# Patient Record
Sex: FEMALE | Race: OTHER | HISPANIC OR LATINO | Employment: UNEMPLOYED | URBAN - METROPOLITAN AREA
[De-identification: names, ages, dates, MRNs, and addresses within clinical notes are randomized per-mention and may not be internally consistent; named-entity substitution may affect disease eponyms.]

---

## 2024-07-30 ENCOUNTER — LAB VISIT (OUTPATIENT)
Dept: LAB | Facility: HOSPITAL | Age: 59
End: 2024-07-30
Attending: INTERNAL MEDICINE
Payer: COMMERCIAL

## 2024-07-30 ENCOUNTER — OFFICE VISIT (OUTPATIENT)
Dept: CARDIOLOGY | Facility: CLINIC | Age: 59
End: 2024-07-30
Payer: COMMERCIAL

## 2024-07-30 VITALS
BODY MASS INDEX: 30.17 KG/M2 | SYSTOLIC BLOOD PRESSURE: 134 MMHG | HEART RATE: 55 BPM | HEIGHT: 61 IN | DIASTOLIC BLOOD PRESSURE: 74 MMHG | WEIGHT: 159.81 LBS

## 2024-07-30 DIAGNOSIS — I20.89 ANGINAL EQUIVALENT: ICD-10-CM

## 2024-07-30 DIAGNOSIS — R94.31 ABNORMAL ECG: Primary | ICD-10-CM

## 2024-07-30 DIAGNOSIS — E78.2 HYPERLIPEMIA, MIXED: ICD-10-CM

## 2024-07-30 LAB
ALBUMIN SERPL BCP-MCNC: 4.3 G/DL (ref 3.5–5.2)
ALP SERPL-CCNC: 69 U/L (ref 55–135)
ALT SERPL W/O P-5'-P-CCNC: 22 U/L (ref 10–44)
ANION GAP SERPL CALC-SCNC: 9 MMOL/L (ref 8–16)
AST SERPL-CCNC: 24 U/L (ref 10–40)
BASOPHILS # BLD AUTO: 0.08 K/UL (ref 0–0.2)
BASOPHILS NFR BLD: 0.9 % (ref 0–1.9)
BILIRUB SERPL-MCNC: 0.6 MG/DL (ref 0.1–1)
BUN SERPL-MCNC: 12 MG/DL (ref 6–20)
CALCIUM SERPL-MCNC: 10.9 MG/DL (ref 8.7–10.5)
CHLORIDE SERPL-SCNC: 106 MMOL/L (ref 95–110)
CO2 SERPL-SCNC: 27 MMOL/L (ref 23–29)
CREAT SERPL-MCNC: 0.8 MG/DL (ref 0.5–1.4)
DIFFERENTIAL METHOD BLD: NORMAL
EOSINOPHIL # BLD AUTO: 0.1 K/UL (ref 0–0.5)
EOSINOPHIL NFR BLD: 1.4 % (ref 0–8)
ERYTHROCYTE [DISTWIDTH] IN BLOOD BY AUTOMATED COUNT: 13.4 % (ref 11.5–14.5)
EST. GFR  (NO RACE VARIABLE): >60 ML/MIN/1.73 M^2
GLUCOSE SERPL-MCNC: 91 MG/DL (ref 70–110)
HCT VFR BLD AUTO: 41 % (ref 37–48.5)
HGB BLD-MCNC: 13.4 G/DL (ref 12–16)
IMM GRANULOCYTES # BLD AUTO: 0.04 K/UL (ref 0–0.04)
IMM GRANULOCYTES NFR BLD AUTO: 0.4 % (ref 0–0.5)
LYMPHOCYTES # BLD AUTO: 2.8 K/UL (ref 1–4.8)
LYMPHOCYTES NFR BLD: 30.6 % (ref 18–48)
MCH RBC QN AUTO: 30.4 PG (ref 27–31)
MCHC RBC AUTO-ENTMCNC: 32.7 G/DL (ref 32–36)
MCV RBC AUTO: 93 FL (ref 82–98)
MONOCYTES # BLD AUTO: 0.7 K/UL (ref 0.3–1)
MONOCYTES NFR BLD: 7.9 % (ref 4–15)
NEUTROPHILS # BLD AUTO: 5.4 K/UL (ref 1.8–7.7)
NEUTROPHILS NFR BLD: 58.8 % (ref 38–73)
NRBC BLD-RTO: 0 /100 WBC
PLATELET # BLD AUTO: 280 K/UL (ref 150–450)
PMV BLD AUTO: 11 FL (ref 9.2–12.9)
POTASSIUM SERPL-SCNC: 3.6 MMOL/L (ref 3.5–5.1)
PROT SERPL-MCNC: 7.5 G/DL (ref 6–8.4)
RBC # BLD AUTO: 4.41 M/UL (ref 4–5.4)
SODIUM SERPL-SCNC: 142 MMOL/L (ref 136–145)
WBC # BLD AUTO: 9.15 K/UL (ref 3.9–12.7)

## 2024-07-30 PROCEDURE — 99204 OFFICE O/P NEW MOD 45 MIN: CPT | Mod: S$GLB,,, | Performed by: INTERNAL MEDICINE

## 2024-07-30 PROCEDURE — 99999 PR PBB SHADOW E&M-EST. PATIENT-LVL III: CPT | Mod: PBBFAC,,, | Performed by: INTERNAL MEDICINE

## 2024-07-30 PROCEDURE — 36415 COLL VENOUS BLD VENIPUNCTURE: CPT | Performed by: INTERNAL MEDICINE

## 2024-07-30 PROCEDURE — 1159F MED LIST DOCD IN RCRD: CPT | Mod: CPTII,S$GLB,, | Performed by: INTERNAL MEDICINE

## 2024-07-30 PROCEDURE — 3075F SYST BP GE 130 - 139MM HG: CPT | Mod: CPTII,S$GLB,, | Performed by: INTERNAL MEDICINE

## 2024-07-30 PROCEDURE — 80053 COMPREHEN METABOLIC PANEL: CPT | Performed by: INTERNAL MEDICINE

## 2024-07-30 PROCEDURE — 85025 COMPLETE CBC W/AUTO DIFF WBC: CPT | Performed by: INTERNAL MEDICINE

## 2024-07-30 PROCEDURE — 1160F RVW MEDS BY RX/DR IN RCRD: CPT | Mod: CPTII,S$GLB,, | Performed by: INTERNAL MEDICINE

## 2024-07-30 PROCEDURE — 3008F BODY MASS INDEX DOCD: CPT | Mod: CPTII,S$GLB,, | Performed by: INTERNAL MEDICINE

## 2024-07-30 PROCEDURE — 3078F DIAST BP <80 MM HG: CPT | Mod: CPTII,S$GLB,, | Performed by: INTERNAL MEDICINE

## 2024-07-30 RX ORDER — METOPROLOL TARTRATE 50 MG/1
TABLET ORAL
Qty: 4 TABLET | Refills: 0 | Status: SHIPPED | OUTPATIENT
Start: 2024-07-30

## 2024-07-30 RX ORDER — ATORVASTATIN CALCIUM 20 MG/1
20 TABLET, FILM COATED ORAL
COMMUNITY
Start: 2024-07-27

## 2024-07-30 NOTE — PROGRESS NOTES
Subjective:   07/30/2024     Patient ID:  Estella Turk is a 58 y.o. female who presents for evaulation of [facility]       Patient referred for EKG abnormality, today noted to have fairly marked anterior inferior ST elevation and T-wave inversion.  She does have dyspnea on exertion.  Currently no chest pains or tightness.  Her EKG has been abnormal for the last several months.  She has no history of heart problems.  She does note that she has a sister who has something wrong with the heart.  She does not know what it is.    The patient does have borderline diabetes.  Last A1c was 6.0.      She does not have hypertension.      She has never smoked cigarettes.      She does have hypercholesterolemia /mixed hyperlipidemia.  Recently total cholesterol 190, triglycerides 155, down from 215, HDL 42 , down from 133.  Currently on atorvastatin 20 mg daily.        No past medical history on file.    Review of patient's allergies indicates:  No Known Allergies      Current Outpatient Medications:     atorvastatin (LIPITOR) 20 MG tablet, Take 20 mg by mouth., Disp: , Rfl:     metoprolol tartrate (LOPRESSOR) 50 MG tablet, Take 2 tablets 1 hour prior to CT coronary angiogram, bring the other 2 tablets with you to the test, Disp: 4 tablet, Rfl: 0     Objective:   Review of Systems   Cardiovascular:  Positive for dyspnea on exertion. Negative for chest pain, claudication, cyanosis, irregular heartbeat, leg swelling, near-syncope, orthopnea, palpitations, paroxysmal nocturnal dyspnea and syncope.         Vitals:    07/30/24 1146   BP: 134/74   Pulse: (!) 55     Wt Readings from Last 3 Encounters:   07/30/24 72.5 kg (159 lb 13.3 oz)     Temp Readings from Last 3 Encounters:   No data found for Temp     BP Readings from Last 3 Encounters:   07/30/24 134/74     Pulse Readings from Last 3 Encounters:   07/30/24 (!) 55             Physical Exam  Vitals reviewed.   Constitutional:       General: She is not in acute distress.     " Appearance: She is well-developed.   HENT:      Head: Normocephalic and atraumatic.      Nose: Nose normal.   Eyes:      Conjunctiva/sclera: Conjunctivae normal.      Pupils: Pupils are equal, round, and reactive to light.   Neck:      Vascular: No carotid bruit or JVD.   Cardiovascular:      Rate and Rhythm: Normal rate and regular rhythm.      Pulses: Normal pulses and intact distal pulses.      Heart sounds: Normal heart sounds. No murmur heard.     No friction rub. No gallop.   Pulmonary:      Effort: Pulmonary effort is normal. No respiratory distress.      Breath sounds: Normal breath sounds. No wheezing or rales.   Chest:      Chest wall: No tenderness.   Abdominal:      General: Bowel sounds are normal. There is no distension.      Palpations: Abdomen is soft.      Tenderness: There is no abdominal tenderness.   Musculoskeletal:         General: No tenderness or deformity. Normal range of motion.      Cervical back: Normal range of motion and neck supple.      Right lower leg: No edema.      Left lower leg: No edema.   Skin:     General: Skin is warm and dry.      Findings: No erythema or rash.   Neurological:      Mental Status: She is alert and oriented to person, place, and time.      Cranial Nerves: No cranial nerve deficit.      Motor: No abnormal muscle tone.      Coordination: Coordination normal.   Psychiatric:         Behavior: Behavior normal.         Thought Content: Thought content normal.         Judgment: Judgment normal.           No results found for: "CHOL"  No results found for: "HDL"  No results found for: "LDLCALC"  No results found for: "ALT", "AST"  No results found for: "CREATININE", "BUN", "NA", "K", "CO2"  No results found for: "HGB", "HCT"                      Assessment and Plan:     Abnormal ECG  -     CTA Cardiac; Future; Expected date: 07/30/2024  -     metoprolol tartrate (LOPRESSOR) 50 MG tablet; Take 2 tablets 1 hour prior to CT coronary angiogram, bring the other 2 tablets " with you to the test  Dispense: 4 tablet; Refill: 0    Hyperlipemia, mixed  -     IN OFFICE EKG 12-LEAD (to Muse); Future; Expected date: 08/06/2024    Anginal equivalent  -     CTA Cardiac; Future; Expected date: 07/30/2024  -     metoprolol tartrate (LOPRESSOR) 50 MG tablet; Take 2 tablets 1 hour prior to CT coronary angiogram, bring the other 2 tablets with you to the test  Dispense: 4 tablet; Refill: 0  -     Echo; Future; Expected date: 07/31/2024  -     CBC Auto Differential; Future; Expected date: 08/06/2024  -     Comprehensive Metabolic Panel; Future; Expected date: 08/06/2024         No follow-ups on file.          Future Appointments   Date Time Provider Department Center   8/15/2024  1:00 PM Stew Kerr FNP [facility]

## 2024-08-02 LAB
OHS QRS DURATION: 84 MS
OHS QTC CALCULATION: 436 MS

## 2024-08-09 ENCOUNTER — TELEPHONE (OUTPATIENT)
Dept: OTOLARYNGOLOGY | Facility: CLINIC | Age: 59
End: 2024-08-09
Payer: COMMERCIAL

## 2024-08-13 ENCOUNTER — TELEPHONE (OUTPATIENT)
Dept: CARDIOLOGY | Facility: HOSPITAL | Age: 59
End: 2024-08-13
Payer: COMMERCIAL

## 2024-08-13 ENCOUNTER — PATIENT MESSAGE (OUTPATIENT)
Dept: CARDIOLOGY | Facility: HOSPITAL | Age: 59
End: 2024-08-13
Payer: COMMERCIAL

## 2024-08-13 NOTE — TELEPHONE ENCOUNTER
Attempted to call patient in regards to their upcoming Cardiac CTA scheduled for 08/16/2024 at 9:00.     Reminder for the patient to fast for 4-hours prior to the test, no caffeine the AM of, and can have water.     For questions or concerns: I am available M-F 7:30-4, please call [phone]. Thank you!

## 2024-08-14 ENCOUNTER — TELEPHONE (OUTPATIENT)
Dept: CARDIOLOGY | Facility: HOSPITAL | Age: 59
End: 2024-08-14
Payer: COMMERCIAL

## 2024-08-14 ENCOUNTER — PATIENT MESSAGE (OUTPATIENT)
Dept: CARDIOLOGY | Facility: HOSPITAL | Age: 59
End: 2024-08-14
Payer: COMMERCIAL

## 2024-08-14 NOTE — TELEPHONE ENCOUNTER
I had the pleasure of discussing your upcoming Cardiac CTA scheduled for 08/16/2024 at 9:00. To review, we discussed showing up 15-20 minutes before your appointment time. Your test is located at [facility] on the corner of [address], address [address], [place] [address], next door to [employer].     I have reviewed your current medications that you take and I've discussed the Cardiac CTA prep for heart rate management with Dr. Gray, the interpreting MD. He agrees with the plan given to you by Dr. Quintero, which is to take 100mg of Metoprolol tartrate (Lopressor) 2-hours prior to the CTA and bring the other 100mg with you in the event more is needed.    Reminder for a 4-hour fasting time, no caffeine the AM of, and you can have water, anywhere from 16-32 ounces before and after completion of the test. It is advisable to have someone transport you to and from the test as a safety precaution. Thank you again for your time today. For any questions or concerns: I am available M-F from 7:30-4, please call [phone].

## 2024-08-14 NOTE — TELEPHONE ENCOUNTER
Called patient to review instructions for cardiac CTA. Patient requested me to call at 3:30 once she's through with work. Will call patient back.

## 2024-08-16 ENCOUNTER — HOSPITAL ENCOUNTER (OUTPATIENT)
Dept: CARDIOLOGY | Facility: HOSPITAL | Age: 59
Discharge: HOME | End: 2024-08-16
Attending: INTERNAL MEDICINE
Payer: COMMERCIAL

## 2024-08-16 ENCOUNTER — HOSPITAL ENCOUNTER (OUTPATIENT)
Dept: RADIOLOGY | Facility: HOSPITAL | Age: 59
Discharge: HOME | End: 2024-08-16
Attending: INTERNAL MEDICINE
Payer: COMMERCIAL

## 2024-08-16 VITALS — DIASTOLIC BLOOD PRESSURE: 72 MMHG | SYSTOLIC BLOOD PRESSURE: 134 MMHG | OXYGEN SATURATION: 97 % | HEART RATE: 57 BPM

## 2024-08-16 VITALS
HEART RATE: 43 BPM | SYSTOLIC BLOOD PRESSURE: 145 MMHG | HEIGHT: 61 IN | DIASTOLIC BLOOD PRESSURE: 85 MMHG | WEIGHT: 159 LBS | BODY MASS INDEX: 30.02 KG/M2

## 2024-08-16 DIAGNOSIS — R94.31 ABNORMAL ECG: ICD-10-CM

## 2024-08-16 DIAGNOSIS — I42.2 APICAL VARIANT HYPERTROPHIC CARDIOMYOPATHY: Primary | Chronic | ICD-10-CM

## 2024-08-16 DIAGNOSIS — I20.89 ANGINAL EQUIVALENT: ICD-10-CM

## 2024-08-16 PROBLEM — I27.20 PULMONARY HYPERTENSION: Status: ACTIVE | Noted: 2024-08-16

## 2024-08-16 PROBLEM — I27.20 PULMONARY HYPERTENSION: Chronic | Status: ACTIVE | Noted: 2024-08-16

## 2024-08-16 LAB
ASCENDING AORTA: 2.9 CM
AV INDEX (PROSTH): 0.76
AV MEAN GRADIENT: 5 MMHG
AV PEAK GRADIENT: 11 MMHG
AV VALVE AREA BY VELOCITY RATIO: 1.76 CM²
AV VALVE AREA: 1.76 CM²
AV VELOCITY RATIO: 0.76
BSA FOR ECHO PROCEDURE: 1.76 M2
CV ECHO LV RWT: 0.41 CM
DOP CALC AO PEAK VEL: 1.64 M/S
DOP CALC AO VTI: 37.12 CM
DOP CALC LVOT AREA: 2.3 CM2
DOP CALC LVOT DIAMETER: 1.72 CM
DOP CALC LVOT PEAK VEL: 1.24 M/S
DOP CALC LVOT STROKE VOLUME: 65.33 CM3
DOP CALC MV VTI: 28.9 CM
DOP CALCLVOT PEAK VEL VTI: 28.13 CM
E WAVE DECELERATION TIME: 283.66 MSEC
E/A RATIO: 1.59
E/E' RATIO: 10.43 M/S
ECHO LV POSTERIOR WALL: 1.1 CM (ref 0.6–1.1)
FRACTIONAL SHORTENING: 28 % (ref 28–44)
HR MV ECHO: 43 BPM
INTERVENTRICULAR SEPTUM: 1.1 CM (ref 0.6–1.1)
LA MAJOR: 5.12 CM
LA MINOR: 4.3 CM
LA WIDTH: 3.63 CM
LEFT ATRIUM SIZE: 3.88 CM
LEFT ATRIUM VOLUME INDEX MOD: 30.3 ML/M2
LEFT ATRIUM VOLUME INDEX: 32.7 ML/M2
LEFT ATRIUM VOLUME MOD: 51.75 CM3
LEFT ATRIUM VOLUME: 55.96 CM3
LEFT INTERNAL DIMENSION IN SYSTOLE: 3.87 CM (ref 2.1–4)
LEFT VENTRICLE DIASTOLIC VOLUME INDEX: 80.44 ML/M2
LEFT VENTRICLE DIASTOLIC VOLUME: 137.55 ML
LEFT VENTRICLE MASS INDEX: 135 G/M2
LEFT VENTRICLE SYSTOLIC VOLUME INDEX: 37.9 ML/M2
LEFT VENTRICLE SYSTOLIC VOLUME: 64.84 ML
LEFT VENTRICULAR INTERNAL DIMENSION IN DIASTOLE: 5.34 CM (ref 3.5–6)
LEFT VENTRICULAR MASS: 230.55 G
LV LATERAL E/E' RATIO: 9.13 M/S
LV SEPTAL E/E' RATIO: 12.17 M/S
MV A" WAVE DURATION": 16.37 MSEC
MV MEAN GRADIENT: 1 MMHG
MV PEAK A VEL: 0.46 M/S
MV PEAK E VEL: 0.73 M/S
MV PEAK GRADIENT: 3 MMHG
MV STENOSIS PRESSURE HALF TIME: 82.26 MS
MV VALVE AREA BY CONTINUITY EQUATION: 2.26 CM2
MV VALVE AREA P 1/2 METHOD: 2.67 CM2
PISA MRMAX VEL: 0.05 M/S
PISA TR MAX VEL: 3.34 M/S
PULM VEIN S/D RATIO: 1.55
PV PEAK D VEL: 0.29 M/S
PV PEAK S VEL: 0.45 M/S
RA MAJOR: 4.63 CM
RA PRESSURE ESTIMATED: 3 MMHG
RA WIDTH: 2.6 CM
RIGHT VENTRICLE DIASTOLIC BASEL DIMENSION: 2.6 CM
RV TB RVSP: 6 MMHG
SINUS: 2.79 CM
STJ: 2.63 CM
TDI LATERAL: 0.08 M/S
TDI SEPTAL: 0.06 M/S
TDI: 0.07 M/S
TR MAX PG: 45 MMHG
TRICUSPID ANNULAR PLANE SYSTOLIC EXCURSION: 1.91 CM
TV REST PULMONARY ARTERY PRESSURE: 48 MMHG
Z-SCORE OF LEFT VENTRICULAR DIMENSION IN END DIASTOLE: 1.17
Z-SCORE OF LEFT VENTRICULAR DIMENSION IN END SYSTOLE: 2.17

## 2024-08-16 PROCEDURE — 25000242 PHARM REV CODE 250 ALT 637 W/ HCPCS: Performed by: INTERNAL MEDICINE

## 2024-08-16 PROCEDURE — 93306 TTE W/DOPPLER COMPLETE: CPT | Mod: 26,,, | Performed by: INTERNAL MEDICINE

## 2024-08-16 PROCEDURE — 93306 TTE W/DOPPLER COMPLETE: CPT

## 2024-08-16 PROCEDURE — 75574 CT ANGIO HRT W/3D IMAGE: CPT | Mod: 26,,, | Performed by: RADIOLOGY

## 2024-08-16 PROCEDURE — 25500020 PHARM REV CODE 255: Performed by: INTERNAL MEDICINE

## 2024-08-16 PROCEDURE — 75574 CT ANGIO HRT W/3D IMAGE: CPT | Mod: TC

## 2024-08-16 RX ORDER — NITROGLYCERIN 0.4 MG/1
0.4 TABLET SUBLINGUAL ONCE
Status: COMPLETED | OUTPATIENT
Start: 2024-08-16 | End: 2024-08-16

## 2024-08-16 RX ADMIN — IOHEXOL 100 ML: 350 INJECTION, SOLUTION INTRAVENOUS at 08:08

## 2024-08-16 RX ADMIN — NITROGLYCERIN 0.4 MG: 0.4 TABLET SUBLINGUAL at 08:08

## 2024-08-16 NOTE — NURSING
Pt VSS post CTA study. Pt exhibiting no adverse effects from medication. Pt able to adequately ambulate with no dizziness or SOB. IV removed, site dressed with guaze and coban. [facility]. Pt educated on post study instructions. Verbalized understanding.

## 2024-08-16 NOTE — NURSING
Pt arrived to [facility] for cardiac CTA.  Pt AAOx4, ambulatory, no distress noted. Pt informed of procedure, need for PIV and side effects of contrast and nitroglycerin.  Pt connected for continuous vital sign monitoring. VS assessed and put in flowsheets.  IV in place.  Pt verbalizes understanding.

## 2024-10-17 ENCOUNTER — PATIENT MESSAGE (OUTPATIENT)
Dept: RESEARCH | Facility: HOSPITAL | Age: 59
End: 2024-10-17
Payer: COMMERCIAL

## 2024-11-19 ENCOUNTER — TELEPHONE (OUTPATIENT)
Dept: OTOLARYNGOLOGY | Facility: CLINIC | Age: 59
End: 2024-11-19
Payer: COMMERCIAL

## 2024-11-19 NOTE — TELEPHONE ENCOUNTER
Spoke with pt , got appts scheduled with audio and follow up after with Mrs. Stew Kerr on 11/26/2024 at 1. Verb understanding

## 2024-11-19 NOTE — TELEPHONE ENCOUNTER
----- Message from [employer] sent at 11/19/2024 12:28 PM CST -----  Name of Who is Calling: Pt  Robert is calling on behalf of TIM OBANDO [[id number]]          What is the request in detail: Pt  is requesting a call back to get scheduled for an office visit. [facility] is not pulling up a schedule. Please assist.           Can the clinic reply by [facility]: No          What Number to Call Back if not in [facility]:  [phone] Tim or [phone] Robert

## 2024-11-26 ENCOUNTER — CLINICAL SUPPORT (OUTPATIENT)
Dept: AUDIOLOGY | Facility: CLINIC | Age: 59
End: 2024-11-26
Payer: COMMERCIAL

## 2024-11-26 ENCOUNTER — OFFICE VISIT (OUTPATIENT)
Dept: OTOLARYNGOLOGY | Facility: CLINIC | Age: 59
End: 2024-11-26
Payer: COMMERCIAL

## 2024-11-26 VITALS
BODY MASS INDEX: 30.01 KG/M2 | DIASTOLIC BLOOD PRESSURE: 79 MMHG | SYSTOLIC BLOOD PRESSURE: 136 MMHG | HEIGHT: 61 IN | WEIGHT: 158.94 LBS

## 2024-11-26 DIAGNOSIS — H93.11 TINNITUS OF RIGHT EAR: ICD-10-CM

## 2024-11-26 DIAGNOSIS — H90.A22 SENSORINEURAL HEARING LOSS (SNHL) OF LEFT EAR WITH RESTRICTED HEARING OF RIGHT EAR: ICD-10-CM

## 2024-11-26 DIAGNOSIS — H72.92 PERFORATION OF LEFT TYMPANIC MEMBRANE: ICD-10-CM

## 2024-11-26 DIAGNOSIS — H90.A31 MIXED CONDUCTIVE AND SENSORINEURAL HEARING LOSS OF RIGHT EAR WITH RESTRICTED HEARING OF LEFT EAR: Primary | ICD-10-CM

## 2024-11-26 PROCEDURE — 1159F MED LIST DOCD IN RCRD: CPT | Mod: CPTII,S$GLB,, | Performed by: NURSE PRACTITIONER

## 2024-11-26 PROCEDURE — 3008F BODY MASS INDEX DOCD: CPT | Mod: CPTII,S$GLB,, | Performed by: NURSE PRACTITIONER

## 2024-11-26 PROCEDURE — 92557 COMPREHENSIVE HEARING TEST: CPT | Mod: S$GLB,,,

## 2024-11-26 PROCEDURE — 99204 OFFICE O/P NEW MOD 45 MIN: CPT | Mod: S$GLB,,, | Performed by: NURSE PRACTITIONER

## 2024-11-26 PROCEDURE — 92567 TYMPANOMETRY: CPT | Mod: S$GLB,,,

## 2024-11-26 PROCEDURE — 3075F SYST BP GE 130 - 139MM HG: CPT | Mod: CPTII,S$GLB,, | Performed by: NURSE PRACTITIONER

## 2024-11-26 PROCEDURE — 3078F DIAST BP <80 MM HG: CPT | Mod: CPTII,S$GLB,, | Performed by: NURSE PRACTITIONER

## 2024-11-26 NOTE — PROGRESS NOTES
OTOLARYNGOLOGY CLINIC NOTE  Date:  11/26/2024     Chief complaint:  Chief Complaint   Patient presents with    Tinnitus     Right ear     History of Present Illness  Estella Turk is a 58 y.o. female  presenting today for a new evaluation and treatment of tinnitus.  Pt reports having ringing sound to her right ear.  Pt reports it started about a year ago.  Pt reports she was seen by ENT in [place] at that time and told she had a little hearing loss.  Pt reports having a MRI done at that time and being told nothing was found on the test.  Pt reports lately she has been hearing the sound more continuously and having more difficulty hearing out of her right ear.  Pt denies any pain or drainage from the ear.  Pt reports having surgery on her left ear about 20 years ago due to an infection.  Pt reports after the surgery her hearing was normal.  Pt denies any nasal congestion, post nasal drip, rhinitis, facial pain or pressure.  Pt denies any otalgia, otorrhea, full, pressure or vertigo.    Review of medical records and prior documentation  Past medical records were reviewed with data pertinent to the chief complaint summarized in the HPI. Information obtained from review of medical records is attributed to respective sources in the HPI with reference to sources of information at their mention. Records reviewed included all recent notes from referring provider, primary care, and related subspecialty evaluations as available. This review of records was performed and additional data obtained to supplement history obtained from the patient and further inform medical decision making involved in formulating a plan of care accounting for all history and treatment relevant to the issues addressed.    Past Medical History  No past medical history on file.     Past Surgical History  No past surgical history on file.     Medications  Current Outpatient Medications on File Prior to Visit   Medication Sig Dispense Refill     "atorvastatin (LIPITOR) 20 MG tablet Take 20 mg by mouth.      metoprolol tartrate (LOPRESSOR) 50 MG tablet Take 2 tablets 1 hour prior to CT coronary angiogram, bring the other 2 tablets with you to the test 4 tablet 0     No current facility-administered medications on file prior to visit.     Review of Systems  Review of Systems   Constitutional: Negative.    HENT:  Positive for hearing loss.    Eyes: Negative.    Respiratory: Negative.     Cardiovascular: Negative.    Gastrointestinal: Negative.    Genitourinary: Negative.    Musculoskeletal: Negative.    Skin: Negative.    Neurological: Negative.    Psychiatric/Behavioral: Negative.        Social History   reports that she has never smoked. She has never used smokeless tobacco.     Family History  No family history on file.     Physical Exam   Vitals:    11/26/24 1318   BP: 136/79    Body mass index is 30.03 kg/m².  Weight: 72.1 kg (158 lb 15.2 oz)   Height: 5' 1" (154.9 cm)     GENERAL: no acute distress.  HEAD: normocephalic.   EYES: lids and lashes normal. No scleral icterus  EARS: external ear without lesion, normal pinna shape and position.  External auditory canal with normal cerumen, tympanic membrane fully visible, no perforation , no retraction. No middle ear effusion. Ossicles intact.   NOSE: external nose without significant bony abnormality  ORAL CAVITY/OROPHARYNX: tongue midline and mobile. Symmetric palate rise. Uvula midline.   NECK: trachea midline.   LYMPH NODES:No cervical lymphadenopathy.  RESPIRATORY: no stridor, no stertor. Voice normal. Respirations nonlabored.  NEURO: alert, responds to questions appropriately.   Cranial nerve exam as indicated in above sections and additionally showed facial movement symmetric with good eye closure and symmetric smile.   PSYCH:mood appropriate    Imaging:  The patient does not have any pertinent and/or recent imaging of the head and neck.     Labs:  CBC  Recent Labs   Lab 07/30/24  1230   WBC 9.15 "   Hemoglobin 13.4   Hematocrit 41.0   MCV 93   Platelets 280     BMP  Recent Labs   Lab 07/30/24  1230   Glucose 91   Sodium 142   Potassium 3.6   Chloride 106   CO2 27   BUN 12   Creatinine 0.8   Calcium 10.9 H         AUDIOLOGY RESULTS  Audiometric evaluation including audiogram, tympanometry, acoustic reflexes, and speech discrimination which was performed  was personally reviewed and interpreted.  Notable findings on the audiogram were severe to profound mixed hearin gloss for the right ear and mild sloping to severe sensorineural hearing loss (SNHL) for the left ear.  Tympanometry revealed Type B tympanogram on the left and Type C tympanogram on the right. Speech discrimination was 96% on the left, and 80% on the right.  Report of the audiologist performing this audiometric testing was also reviewed.     Assessment  1. 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 Sensorineural hearing loss (SNHL) of left ear with restricted hearing of right ear    3. Perforation of left tympanic membrane    4. Tinnitus of right ear     Plan:  Pt advised of audiogram results and perforation to her left ear.  Pt advised to keep left ear dry and do not use q-tips.  Pt advise will obtain CT scan of temporal bone and have her to f/u with Dr. De Paz.    Discussed plan of care with patient in detail and all questions answered. Patient reported understanding of plan of care.

## 2024-11-26 NOTE — PROGRESS NOTES
Please click on link to view Audiogram:  Document on 11/26/2024 1:17 PM by Catarina Royal AU.D: Audiogram    Ms. Estella Turk, a 58 y.o. female, was seen in the clinic today for an audiological evaluation. Ms. Turk's main complaint was tinnitus in her right ear that started approximately 1 year ago.    Otoscopy clear bilaterally. Tympanometry testing revealed a Type C tympanogram for the right ear and a Type B tympanogram with normal ear canal volume for the left ear.     Audiological testing revealed a severe to profound mixed hearing loss (MHL) for the right ear and a mild sloping to severe sensorineural hearing loss (SNHL) with a conductive component at 4000 Hz for the left ear. A speech reception threshold was obtained at 95 dBHL for the right ear and at 40 dBHL for the left ear. Speech discrimination was 80% for the right ear and 96% for the left ear.      Recommendations:  1. Otologic evaluation  2. Annual audiological evaluation, sooner if medically necessary or if hearing changes  3. Hearing protection when in noise   4. Utilize ReSTrendzo Relief rebecca and other tinnitus management strategies discussed during appointment (lower salt/caffeine intake, monitor stress/anxiety levels, soft background noise in quiet)  5. Hearing aid consultation following medical clearance

## 2025-01-22 ENCOUNTER — TELEPHONE (OUTPATIENT)
Dept: OTOLARYNGOLOGY | Facility: CLINIC | Age: 60
End: 2025-01-22
Payer: COMMERCIAL

## 2025-01-22 ENCOUNTER — PATIENT MESSAGE (OUTPATIENT)
Dept: OTOLARYNGOLOGY | Facility: CLINIC | Age: 60
End: 2025-01-22
Payer: COMMERCIAL

## 2025-01-22 NOTE — TELEPHONE ENCOUNTER
Lvm for pt informing clinic is closed tomorrow, reschuduled appt to next available date 02/27/2025 at 10:00, also needs to have CT done before f/u, asked to call us at [phone] if needs help getting that scheduled.